# Patient Record
Sex: MALE | Race: WHITE | NOT HISPANIC OR LATINO | URBAN - METROPOLITAN AREA
[De-identification: names, ages, dates, MRNs, and addresses within clinical notes are randomized per-mention and may not be internally consistent; named-entity substitution may affect disease eponyms.]

---

## 2020-01-30 ENCOUNTER — HOSPITAL ENCOUNTER (EMERGENCY)
Facility: MEDICAL CENTER | Age: 40
End: 2020-01-30
Attending: EMERGENCY MEDICINE

## 2020-01-30 VITALS
SYSTOLIC BLOOD PRESSURE: 129 MMHG | DIASTOLIC BLOOD PRESSURE: 92 MMHG | OXYGEN SATURATION: 93 % | HEIGHT: 75 IN | BODY MASS INDEX: 30.65 KG/M2 | WEIGHT: 246.47 LBS | TEMPERATURE: 98 F | HEART RATE: 75 BPM | RESPIRATION RATE: 16 BRPM

## 2020-01-30 DIAGNOSIS — H66.002 ACUTE SUPPURATIVE OTITIS MEDIA OF LEFT EAR WITHOUT SPONTANEOUS RUPTURE OF TYMPANIC MEMBRANE, RECURRENCE NOT SPECIFIED: ICD-10-CM

## 2020-01-30 PROCEDURE — 99283 EMERGENCY DEPT VISIT LOW MDM: CPT

## 2020-01-30 RX ORDER — AMOXICILLIN AND CLAVULANATE POTASSIUM 875; 125 MG/1; MG/1
1 TABLET, FILM COATED ORAL 2 TIMES DAILY
Qty: 20 TAB | Refills: 0 | Status: SHIPPED | OUTPATIENT
Start: 2020-01-30 | End: 2020-02-09

## 2020-01-30 ASSESSMENT — PAIN DESCRIPTION - DESCRIPTORS: DESCRIPTORS: STABBING;PRESSURE

## 2020-01-30 NOTE — ED PROVIDER NOTES
"ED Provider Note    Scribed for Ajay Paul M.D. by Boom Rivera. 1/30/2020, 3:44 AM.    Primary care provider: None reported.  Means of arrival: Walk-In  History obtained from: Patient  History limited by: None    CHIEF COMPLAINT  Chief Complaint   Patient presents with   • Ear Pain     Pt reports pain X3 days, recent cold symptoms. Pain rated 7/10.        HPI  Shalini Flynn is a 40 y.o. male who presents to the Emergency Department for evaluation of left ear pain onset four days ago. He noticed that in his left ear he began to hear an echoing noise which was what prompted him to present to the ED. His ear pain began before his cold-like symptoms. The patient reports associated sinus congestion, dry cough, tinnitus, subjective fever and chills. Negative for nausea or vomiting. He has been using ear drops and anti-inflammatories with little to no alleviation to his pain. The patient denies any pertinent medial.     REVIEW OF SYSTEMS  See HPI for further details.     PAST MEDICAL HISTORY    None pertinent reported.    SURGICAL HISTORY  patient denies any surgical history    SOCIAL HISTORY  Social History     Tobacco Use   • Smoking status: Never Smoker   • Smokeless tobacco: Never Used   Substance Use Topics   • Alcohol use: None reported   • Drug use: Never      Social History     Substance and Sexual Activity   Drug Use Never       FAMILY HISTORY  History reviewed. No pertinent family history.    CURRENT MEDICATIONS  Reviewed.  See Encounter Summary.     ALLERGIES  No Known Allergies    PHYSICAL EXAM  VITAL SIGNS: /88   Pulse 77   Temp 36.6 °C (97.9 °F) (Oral)   Resp 16   Ht 1.905 m (6' 3\")   Wt 111.8 kg (246 lb 7.6 oz)   SpO2 98%   BMI 30.81 kg/m²   Constitutional: Alert in no apparent distress.  HENT: No signs of trauma, Bilateral external ears normal, Nose normal. No mastoid tenderness. Left TM dull, erythematous, and slightly bulging.  Eyes: Pupils are equal and reactive, Conjunctiva " normal, Non-icteric.   Neck: Normal range of motion, No tenderness, Supple, No stridor.   Cardiovascular: Regular rate and rhythm, no murmurs.   Thorax & Lungs: Normal breath sounds, No respiratory distress, No wheezing, No chest tenderness.   Abdomen: Bowel sounds normal, Soft, No tenderness, No masses, No pulsatile masses. No peritoneal signs.  Skin: Warm, Dry, No erythema, No rash.   Back: No bony tenderness, No CVA tenderness.   Extremities: Intact distal pulses, No edema, No tenderness, No cyanosis  Musculoskeletal: Good range of motion in all major joints. No tenderness to palpation or major deformities noted.   Neurologic: Alert , Normal motor function, Normal sensory function, No focal deficits noted.   Psychiatric: Affect normal, Judgment normal, Mood normal.     COURSE & MEDICAL DECISION MAKING  Pertinent Labs & Imaging studies reviewed. (See chart for details)    3:44 AM - Patient seen and examined at bedside by PA resident.  I will re-eveluate the patient to assess physical exam findings.     3:54 AM - Patient examined at bedside. I informed the patient that his ear pain is the result of otitis media. I will prescribe antibiotics for his ear pain and I encouraged him to continue to use the anti-inflammatory medication he has been using. I informed the patient of my plan for discharge, I provided precautions to return for any new or worsening symptoms. The patient verbalized understanding of discharge precautions and is agreeable to my plan.      Decision Making:  This is a 40 y.o. year old male who presents with above complaint.  Exam consistent with an acute otitis media.  Patient will be started empirically on antibiotics.  He is understanding of additional use of anti-inflammatories including acetaminophen and NSAIDs.  He is also understanding of need for sinus care.  He will return with any changes or worsening but otherwise will follow-up with local clinic as referred within the given  timeframe    The patient will return for new or worsening symptoms and is stable at the time of discharge.    The patient is referred to a primary physician for blood pressure management, diabetic screening, and for all other preventative health concerns.    DISPOSITION:  Patient will be discharged home in stable condition.    FOLLOW UP:  [address]  [place] [id number] [phone]  Schedule an appointment as soon as possible for a visit   As needed      OUTPATIENT MEDICATIONS:  Discharge Medication List as of 1/30/2020  3:57 AM      START taking these medications    Details   amoxicillin-clavulanate (AUGMENTIN) 875-125 MG Tab Take 1 Tab by mouth 2 times a day for 10 days., Disp-20 Tab, R-0, Print Rx Paper               FINAL IMPRESSION  1. Acute suppurative otitis media of left ear without spontaneous rupture of tympanic membrane, recurrence not specified          Boom YOO (Gabriel), am scribing for, and in the presence of, Ajay Paul M.D..    Electronically signed by: Boom Rivera (Gabriel), 1/30/2020    Ajay YOO M.D. personally performed the services described in this documentation, as scribed by Boom Rivera in my presence, and it is both accurate and complete.    E.     The note accurately reflects work and decisions made by me.  Ajay Paul M.D.  1/30/2020  9:57 PM

## 2020-01-30 NOTE — ED NOTES
Pt ambulated to [facility] from triage, agree with triage assessment. Pt reports no relief from multiple over the counter remedies, in no acute distress, chart up to be seen.

## 2020-01-30 NOTE — ED TRIAGE NOTES
[facility]  40 [facility]  Chief Complaint   Patient presents with   • Ear Pain     Pt reports pain X3 days, recent cold symptoms. Pain rated 7/10.        No drainage from ear. Pt amb to triage with steady gait for above complaint.   Pt is alert and oriented, speaking in full sentences, follows commands and responds appropriately to questions. Resp are even and unlabored. No behavioral indicators of pain.   Pt placed in lobby. Pt educated on triage process. Pt encouraged to alert staff for any changes.

## 2020-01-30 NOTE — ED NOTES
Patient in no acute distress, verbalized understanding of discharge instructions, provided with discharge paperwork, aware of follow up care and reasons to return to the ER, gait steady, ambulated independently to ER lobby.

## 2023-06-12 ENCOUNTER — APPOINTMENT (OUTPATIENT)
Dept: URGENT CARE | Facility: CLINIC | Age: 43
End: 2023-06-12
Payer: COMMERCIAL